# Patient Record
Sex: FEMALE | Race: BLACK OR AFRICAN AMERICAN
[De-identification: names, ages, dates, MRNs, and addresses within clinical notes are randomized per-mention and may not be internally consistent; named-entity substitution may affect disease eponyms.]

---

## 2017-10-18 ENCOUNTER — OFFICE VISIT (OUTPATIENT)
Dept: FAMILY MEDICINE | Facility: CLINIC | Age: 32
End: 2017-10-18

## 2017-10-18 VITALS
WEIGHT: 289.6 LBS | TEMPERATURE: 98.4 F | SYSTOLIC BLOOD PRESSURE: 120 MMHG | OXYGEN SATURATION: 98 % | DIASTOLIC BLOOD PRESSURE: 84 MMHG | BODY MASS INDEX: 46.74 KG/M2 | HEART RATE: 90 BPM | RESPIRATION RATE: 16 BRPM

## 2017-10-18 DIAGNOSIS — F19.20 CHEMICAL DEPENDENCY (H): Primary | ICD-10-CM

## 2017-10-18 DIAGNOSIS — N61.1 BOIL, BREAST: ICD-10-CM

## 2017-10-18 DIAGNOSIS — F51.4 NIGHT TERRORS, ADULT: ICD-10-CM

## 2017-10-18 DIAGNOSIS — F31.9 BIPOLAR 1 DISORDER (H): ICD-10-CM

## 2017-10-18 DIAGNOSIS — F41.9 ANXIETY: ICD-10-CM

## 2017-10-18 DIAGNOSIS — Z71.6 ENCOUNTER FOR TOBACCO USE CESSATION COUNSELING: ICD-10-CM

## 2017-10-18 ASSESSMENT — ANXIETY QUESTIONNAIRES
7. FEELING AFRAID AS IF SOMETHING AWFUL MIGHT HAPPEN: NOT AT ALL
1. FEELING NERVOUS, ANXIOUS, OR ON EDGE: SEVERAL DAYS
IF YOU CHECKED OFF ANY PROBLEMS ON THIS QUESTIONNAIRE, HOW DIFFICULT HAVE THESE PROBLEMS MADE IT FOR YOU TO DO YOUR WORK, TAKE CARE OF THINGS AT HOME, OR GET ALONG WITH OTHER PEOPLE: NOT DIFFICULT AT ALL
6. BECOMING EASILY ANNOYED OR IRRITABLE: NOT AT ALL
GAD7 TOTAL SCORE: 3
2. NOT BEING ABLE TO STOP OR CONTROL WORRYING: NOT AT ALL
3. WORRYING TOO MUCH ABOUT DIFFERENT THINGS: SEVERAL DAYS
5. BEING SO RESTLESS THAT IT IS HARD TO SIT STILL: NOT AT ALL

## 2017-10-18 ASSESSMENT — PATIENT HEALTH QUESTIONNAIRE - PHQ9
SUM OF ALL RESPONSES TO PHQ QUESTIONS 1-9: 3
5. POOR APPETITE OR OVEREATING: SEVERAL DAYS

## 2017-10-18 ASSESSMENT — PAIN SCALES - GENERAL: PAINLEVEL: MODERATE PAIN (5)

## 2017-10-18 NOTE — PROGRESS NOTES
HPI:       Judie Golden is a 32 year old who presents for the following  Patient presents with:  Derm Problem    Judie comes to clinic, The patient is new to this clinic and this writer.  1/Admitted [facility], meth clean date 7/28/17,    She had a boil on her left breast, she had a pocket that needed to be drained, she got her boil drained on Monday, 2 days ago, they packed it with gauze, she was at [facility] [place]. She has not had a dressing change. She's had boils in the past, but does not get them frequently, she once had one on her tailbone that needed to be drained. She had the pilonidal cyst last month. She started antibiotics for her boil, she is getting upset stomach. No fevers. She comes to clinic for assessment of boil that was drained, she has not showered or removed the gauze or changed the dressing from her left breast over the last 2 days. She feels pain and thinks it could be coming from the gauze/packing to left breast.     All meds reviewed, patient has hx of bipolar, night terrors, anxiety. She is taking meds listed below.     She is smoking 1/2ppd of cig.      Problem, Medication and Allergy Lists were   reviewed and are current.     Patient Active Problem List    Diagnosis Date Noted     Anxiety 10/18/2017     Priority: Medium     Bipolar 1 disorder (H) 10/18/2017     Priority: Medium     Night terrors, adult 10/18/2017     Priority: Medium     Chemical dependency (H) 10/18/2017     Priority: Medium     Encounter for tobacco use cessation counseling 10/18/2017     Priority: Medium         Current Outpatient Prescriptions   Medication Sig Dispense Refill     Mirtazapine (REMERON PO) Take 15 mg by mouth At Bedtime       TRAZODONE HCL PO Take 50 mg by mouth At Bedtime       RisperiDONE (RISPERDAL PO) Take 7.5 mg by mouth At Bedtime       CLONIDINE HCL PO Take 0.5 mg by mouth 2 times daily       FLUOXETINE HCL PO Take 20 mg by mouth daily       albuterol (PROAIR HFA, PROVENTIL HFA, VENTOLIN  HFA) 108 (90 BASE) MCG/ACT inhaler Inhale 2 puffs into the lungs every 4 hours as needed for shortness of breath / dyspnea for 30 days. 1 Inhaler 1     folic acid (FOLVITE) 400 MCG tablet Take 1 tablet (400 mcg) by mouth daily (Patient not taking: Reported on 10/18/2017) 90 tablet 3     citalopram (CELEXA) 20 MG tablet Take 20 mg by mouth daily. By [facility] in [place], [place]         No Known Allergies  Patient is   a new patient to this clinic and so  I reviewed/updated the Past Medical History, the Family History and the Social History. ,   Past Medical History:   Diagnosis Date     Depression      Pilonidal cyst 09/2017     Uncomplicated asthma    ,   Past Surgical History:   Procedure Laterality Date     LACERATION REPAIR  2014    Suture laceration repair to right arm     SOFT TISSUE SURGERY  09/2017    surgery on pilonidial cyst     Family History     Problem (# of Occurrences) Relation (Name,Age of Onset)    Depression (1) Mother    Hypertension (1) Mother    MENTAL ILLNESS (1) Father    Substance Abuse (2) Mother, Father    Suicide (1) Father       and   Social History     Social History     Marital status: Unknown     Spouse name: N/A     Number of children: 4     Years of education: 11th     Occupational History      Disabled     Social History Main Topics     Smoking status: Current Every Day Smoker     Packs/day: 0.50     Types: Cigarettes     Smokeless tobacco: Never Used     Alcohol use No     Drug use: No      Comment: Clean 7/28/17, [facility] dep 10/11/17     Sexual activity: No     Other Topics Concern     None     Social History Narrative    Activity: Does routine ADLs, otherwise no routine exercise    Diet: Healthy    Water Intake: 6 glasses of water a day    Caffeine Intake: 3 cans of soda a day, 1-2 cups of coffee a day    Sleep: 5-8 hours a night, does not feel rested when she wakes up, difficultly falling asleep, difficulty staying asleep    Stressors: Chem dep treatment     Support Network: Sisters, brothers    Chiquita/Yazdanism Affiliation: None    Childhood: Born and raised in [place], moved to [place] in 1999, family is in [place], raised by parents    Current Living Situation: Chem dep treatment [facility]    Future: After treatment she would like to be a family with her children, 4 children, 15, 13, 10, 1, 2 girls, 2 boys.              Review of Systems:   Review of Systems    ROS: 10 point ROS neg other than the symptoms noted above in the HPI.         Physical Exam:     Patient Vitals for the past 24 hrs:   BP Temp Temp src Pulse Resp SpO2 Weight   10/18/17 1422 120/84 98.4  F (36.9  C) Oral 90 16 98 % 289 lb 9.6 oz (131.4 kg)     Body mass index is 46.74 kg/(m^2).  Vitals were reviewed and were normal     Physical Exam   Constitutional: She is oriented to person, place, and time. She appears well-developed and well-nourished.   HENT:   Head: Normocephalic and atraumatic.   Nose: Nose normal.   Mouth/Throat: Oropharynx is clear and moist.   Eyes: Pupils are equal, round, and reactive to light.   Neck: Neck supple.   Cardiovascular: Normal rate and regular rhythm.    Pulmonary/Chest: Effort normal and breath sounds normal.   Lymphadenopathy:     She has no cervical adenopathy.   Neurological: She is alert and oriented to person, place, and time.   Skin: Skin is warm and dry.   Left breast at the skin fold there is a small wound with packing present, site has mild surrounding erythema, no edema, small amount of drainage, no odor noted.    Psychiatric: She has a normal mood and affect. Her behavior is normal.         Results:      Results from the last 24 hoursNo results found for this or any previous visit (from the past 24 hour(s)).  Assessment and Plan     Judie was seen today for derm problem.    Diagnoses and all orders for this visit:    Chemical dependency (H)  Patient encouraged in her sobriety, admitted to [facility] 10/11/18, clean from meth since 7/28/17. Continue at [facility]  residential program.    Boil, breast  Left breast boil incised and drained 2 days ago at [facility], packing removed today, wound is healing and no longer needs packing, cleansed with normal saline and covered with bandaid, continue to monitor for signs and symptoms of infection. Incision that is healing is less than 0.5 cm in size.    Anxiety  YUSUF-7 SCORE 10/18/2017   Total Score 3   Continue to monitor mood during transition.    Bipolar 1 disorder (H)  Continue current medication regimen, follow up with psychiatry.     Night terrors, adult  Continue current medication regimen, follow up with psychiatry.     Encounter for tobacco use cessation counseling  Smoking 1/2 ppd of cig will continue to consider cutting back.     Medications Discontinued During This Encounter   Medication Reason     guaiFENesin-codeine (ROBITUSSIN AC) 100-10 MG/5ML SOLN      Options for treatment and follow-up care were reviewed with the patient. Judie Golden  engaged in the decision making process and verbalized understanding of the options discussed and agreed with the final plan.    [facility] paperwork filled out and returned to patient, consent form signed for authorization to discuss protected health information with Leatha Hightower RN, [facility] RN.    Viki Germain, HEMA

## 2017-10-19 ASSESSMENT — ANXIETY QUESTIONNAIRES: GAD7 TOTAL SCORE: 3

## 2017-10-21 ENCOUNTER — HEALTH MAINTENANCE LETTER (OUTPATIENT)
Age: 32
End: 2017-10-21

## 2017-10-30 ENCOUNTER — OFFICE VISIT (OUTPATIENT)
Dept: FAMILY MEDICINE | Facility: CLINIC | Age: 32
End: 2017-10-30

## 2017-10-30 VITALS
SYSTOLIC BLOOD PRESSURE: 123 MMHG | TEMPERATURE: 98.4 F | HEART RATE: 85 BPM | WEIGHT: 293 LBS | DIASTOLIC BLOOD PRESSURE: 80 MMHG | RESPIRATION RATE: 16 BRPM | BODY MASS INDEX: 45.99 KG/M2 | HEIGHT: 67 IN | OXYGEN SATURATION: 100 %

## 2017-10-30 DIAGNOSIS — F51.04 CHRONIC INSOMNIA: ICD-10-CM

## 2017-10-30 DIAGNOSIS — Z00.00 ROUTINE GENERAL MEDICAL EXAMINATION AT A HEALTH CARE FACILITY: Primary | ICD-10-CM

## 2017-10-30 DIAGNOSIS — R20.2 PARESTHESIA: ICD-10-CM

## 2017-10-30 DIAGNOSIS — Z23 NEEDS FLU SHOT: ICD-10-CM

## 2017-10-30 DIAGNOSIS — N89.8 VAGINAL ITCHING: ICD-10-CM

## 2017-10-30 DIAGNOSIS — M54.50 ACUTE BILATERAL LOW BACK PAIN WITHOUT SCIATICA: ICD-10-CM

## 2017-10-30 DIAGNOSIS — F41.9 ANXIETY: ICD-10-CM

## 2017-10-30 DIAGNOSIS — F17.200 TOBACCO DEPENDENCE: ICD-10-CM

## 2017-10-30 DIAGNOSIS — L02.91 ABSCESS: ICD-10-CM

## 2017-10-30 DIAGNOSIS — F33.1 MAJOR DEPRESSIVE DISORDER, RECURRENT EPISODE, MODERATE (H): ICD-10-CM

## 2017-10-30 DIAGNOSIS — I45.6 WPW (WOLFF-PARKINSON-WHITE SYNDROME): ICD-10-CM

## 2017-10-30 DIAGNOSIS — F19.20 CHEMICAL DEPENDENCY (H): ICD-10-CM

## 2017-10-30 DIAGNOSIS — H61.22 IMPACTED CERUMEN OF LEFT EAR: ICD-10-CM

## 2017-10-30 PROBLEM — E66.01 MORBID OBESITY WITH BMI OF 45.0-49.9, ADULT (H): Status: ACTIVE | Noted: 2017-09-05

## 2017-10-30 PROBLEM — F19.10 SUBSTANCE ABUSE (H): Status: ACTIVE | Noted: 2017-08-08

## 2017-10-30 PROBLEM — F41.8 DEPRESSION WITH ANXIETY: Status: ACTIVE | Noted: 2017-08-08

## 2017-10-30 LAB
CLUE CELLS: NORMAL
MOTILE TRICHOMONAS: NEGATIVE
YEAST: PRESENT

## 2017-10-30 RX ORDER — FLUCONAZOLE 150 MG/1
150 TABLET ORAL ONCE
Qty: 2 TABLET | Refills: 0 | Status: SHIPPED | OUTPATIENT
Start: 2017-10-30 | End: 2017-10-30

## 2017-10-30 ASSESSMENT — PAIN SCALES - GENERAL: PAINLEVEL: NO PAIN (0)

## 2017-10-30 NOTE — PROGRESS NOTES
HPI     Judie Golden is a 32 year old female who presents today for physical exam. Judie Golden needs an admission physical to [facility] chemical dependency treatment facility and was admitted 10/11/18. Patient has a history of chemical dependency to meth, clean date 2017.  Last visit to the dentist was > 1 year ago and she'd like to get up to date on a dental cleaning. Last visit to an eye provider was > 1 year ago, she lost her glasses, she has nearsightedness and needs new glasses. Judie Golden would like to get up to date on immunizations and lab work. She would like to get a flu shot today.    . LMP 10/11/2017, regular, once a month, lasts 3-4 days, menarche age 15. Not currently sexually active. She had STI check at physical at [facility] several months ago. She has cysts on her ovaries. She went to the ED 1.5 months ago, she was having severe pain. She had an ultrasound performed and they told her the pain was related to an ovarian cyst.     Care Everywhere records reviewed, patient did have blood work on 17 lipids done ldl 143, HIV and hepatitis were negative.     She recently has had some issues with a pilonidial cyst and breast abscess. Had a Pilonidial cyst on 2017, then the following month 10/2017 she got a left breast abscess, incision and drainage was performed, one week later another cyst was noted to the left breast, this was not incised and drained but fluid was aspirated and a culture was sent. Wound culture was negative and reviewed today, she desires a breast exam. She still feels a lump at that spot on the left breast, no pain.    She has hx of anxiety, depression, insomnia, currently taking medications for this, please see med list.   PHQ-9 SCORE 10/18/2017   Total Score 3     YUSUF-7 SCORE 10/18/2017   Total Score 3     She currently uses tobacco, smoking 1/2 ppd of cigarettes, does not desire to quit but desires to cut back.     She has some concerns with vaginal  itching. It started several days ago. She has been on antibiotics recently for treatment of an abscess. Some pain and burning when she urinates.     She also notes a Small amount of blood with stool. She has some constipation, some blood on Toilet paper 2 days ago. She's never had this before.     She has low back pain without radiation, has been getting worse over the last month, take prn tylenol and ibuprofen.     She has History Lacey-Parkinson-White syndrome, she turned in a zeal patch that recorded her heart rhythms x 2 weeks, returned it and [facility] be following up, [facility]. She last saw cardiology at [facility] last month.     Patient Active Problem List   Diagnosis     Anxiety     Bipolar 1 disorder (H)     Night terrors, adult     Chemical dependency (H)     Encounter for tobacco use cessation counseling     Abscess     Chronic insomnia     Major depressive disorder, recurrent episode, moderate (H)     Morbid obesity with BMI of 45.0-49.9, adult (H)     Right knee pain     Substance abuse     Tobacco dependence     WPW (Lacey-Parkinson-White syndrome)     Paresthesia     Current Outpatient Prescriptions   Medication Sig Dispense Refill     Mirtazapine (REMERON PO) Take 15 mg by mouth At Bedtime       TRAZODONE HCL PO Take 50 mg by mouth At Bedtime       RisperiDONE (RISPERDAL PO) Take 7.5 mg by mouth At Bedtime       CLONIDINE HCL PO Take 0.5 mg by mouth 2 times daily       FLUOXETINE HCL PO Take 20 mg by mouth daily       albuterol (PROAIR HFA, PROVENTIL HFA, VENTOLIN HFA) 108 (90 BASE) MCG/ACT inhaler Inhale 2 puffs into the lungs every 4 hours as needed for shortness of breath / dyspnea for 30 days. 1 Inhaler 1     Allergies   Allergen Reactions     Acetaminophen Swelling     Aspirin Swelling     Results for orders placed or performed in visit on 10/30/17 (from the past 24 hour(s))   Wet Prep (LabDAQ)   Result Value Ref Range    Yeast Wet Prep Present none    Motile  Trichomonas Wet Prep Negative Negative    Clue Cells Wet Prep Present <20% NONE          History     Past Medical History:   Diagnosis Date     Depression      Pilonidal cyst 09/2017     Self-injurious behavior 6/1/2011     Uncomplicated asthma      Past Surgical History:   Procedure Laterality Date     LACERATION REPAIR  2014    Suture laceration repair to right arm     SOFT TISSUE SURGERY  09/2017    surgery on pilonidial cyst     SOFT TISSUE SURGERY  10/2017    Breast abscess     Social History     Social History     Marital status: Unknown     Spouse name: N/A     Number of children: 4     Years of education: 11th     Occupational History      Disabled     Social History Main Topics     Smoking status: Current Every Day Smoker     Packs/day: 0.50     Types: Cigarettes     Smokeless tobacco: Never Used     Alcohol use No     Drug use: No      Comment: Clean 7/28/17, [facility] dep 10/11/17     Sexual activity: No     Other Topics Concern     Not on file     Social History Narrative    Activity: Does routine ADLs, otherwise no routine exercise    Diet: Healthy    Water Intake: 6 glasses of water a day    Caffeine Intake: 3 cans of soda a day, 1-2 cups of coffee a day    Sleep: 5-8 hours a night, does not feel rested when she wakes up, difficultly falling asleep, difficulty staying asleep    Stressors: Chem dep treatment    Support Network: Sisters, brothers    Chiquita/Rastafari Affiliation: None    Childhood: Born and raised in [place], moved to [place] in [place], family is in [place], raised by parents    Current Living Situation: Chem dep treatment [facility]    Future: After treatment she would like to be a family with her children, 4 children, 15, 13, 10, 1, 2 girls, 2 boys.         Family History   Problem Relation Age of Onset     Depression Mother      Hypertension Mother      Substance Abuse Mother      DIABETES Mother      Suicide Father      MENTAL ILLNESS Father      Substance Abuse Father             Review of  "Systems:    ROS: 10 point ROS neg other than the symptoms noted above in the HPI.     Positive paresthesias to hands bilaterally       Physical Exam:     Vitals:    10/30/17 1004   BP: 123/80   BP Location: Right arm   Patient Position: Chair   Cuff Size: Adult Large   Pulse: 85   Resp: 16   Temp: 98.4  F (36.9  C)   TempSrc: Oral   SpO2: 100%   Weight: 296 lb 12.8 oz (134.6 kg)   Height: 5' 6.6\" (169.2 cm)     Body mass index is 47.05 kg/(m^2).    GENERAL: healthy, alert and no distress  EYES: Eyes grossly normal to inspection, extraocular movements - intact, and PERRL  HENT: ear canals- normal; TMs- normal to right ear, left TM not visualized due to cerumen impaction; Nose- normal; Mouth- no ulcers, no lesions  NECK: no tenderness, no adenopathy, no asymmetry, no masses, no stiffness; thyroid- normal to palpation  RESP: lungs clear to auscultation - no rales, no rhonchi, no wheezes  BREAST: no masses, no tenderness, no nipple discharge, no palpable axillary masses or adenopathy, left breast with healing abscess  CV: regular rates and rhythm, normal S1 S2, no S3 or S4 and no murmur, no click or rub -  ABDOMEN: soft, no tenderness, no  hepatosplenomegaly, no masses, normal bowel sounds  MS: extremities- no gross deformities noted, no edema  SKIN: no suspicious lesions, no rashes, inflamed pustules to face and bilateral cheeks  NEURO: strength and tone- normal, sensory exam- grossly normal, mentation- intact, speech- normal, reflexes- symmetric 2+  BACK: no CVA tenderness, no paralumbar tenderness, low back pain across the width of her low back over the lumbar area  - female: cervix- normal, adnexae- normal; uterus- normal, no masses, white odorless discharge  RECTAL- female: Deferred  PSYCH: Alert and oriented times 3; speech- coherent , normal rate and volume; able to articulate logical thoughts, able to abstract reason, no tangential thoughts, no hallucinations or delusions, affect- normal  LYMPHATICS: ant. " cervical- normal, post. cervical- normal, axillary- normal, supraclavicular- normal, inguinal- normal    Orders Only on 01/29/2014   Component Date Value Ref Range Status     HCG Qualitative Serum 01/29/2014 Negative  NEG Final     Assessment and Plan      Juide was seen today for physical, breast exam and back pain.    Diagnoses and all orders for this visit:    Routine general medical examination at a health care facility  32 year old female with physical, discussed health maintenance. Encouraged healthy eating and activity, she is somewhat limited at this time to her physical activity and will work on increasing her activity. She reports getting a physical at [facility] 2 months ago, she will sign a release of records so I can review those, reports pap is WNL and up to date.     Chemical dependency (H)  Patient encouraged in her sobriety, admitted to  [facility] 10/11/18, clean from meth 7/28/17    Needs flu shot  -     VACCINE ADMINISTRATION, INITIAL  -     C FLU VAC QUADRIVALENT SPLIT VIRUS 3+YRS IM  Tolerate well    Abscess  Hx of pilonidial cyst, breast abscess. Reassured patient today the left breast is healing, continue to monitor.     Anxiety  YUSUF-7 SCORE 10/18/2017   Total Score 3   Continue Clonidine 0.5 mg po bid, follow up psych    Major depressive disorder, recurrent episode, moderate (H)  PHQ-9 SCORE 10/18/2017   Total Score 3   Continue to monitor mood. Continue fluoxetine 20 mg po daily, risperidone 7.5 mg po at bedtime.    Chronic insomnia  Stable, continue to monitor, continue trazodone 50 mg at bedtime, mirtazapine 15 mg at bedtime.    Tobacco dependence  -     SMOKING CESSATION COUNSELING 3-10 MIN  3 minutes spent on smoking cessation, discussed cutting back, smoking 1/2 ppd, does not desire to quit but will cut back.     Vaginal itching  -     Wet Prep (LabDAQ)  -     fluconazole (DIFLUCAN) 150 MG tablet; Take 1 tablet (150 mg) by mouth once for 1 dose  Yeast infection likely caused by  antibiotics from breast abscess, will treat with diflucan.     Acute bilateral low back pain without sciatica  -     PHYSICAL THERAPY REFERRAL  Discussed starting in PT for back pain, continue tylenol and ibuprofen prn pain.     WPW (Lacey-Parkinson-White syndrome)  Patient turned in heart monitor last week, follow up with cardiology at [facility], she did sign a record of release for her records.     Impacted cerumen of left ear  -     HC REMOVAL IMPACTED CERUMEN IRRIGATION/LVG UNILAT  Good cerumen return after warm water ear flush.     Paresthesia  Paresthesia to bilateral hands, will follow up in 2 weeks for further discussion.     Options for treatment and follow-up care were reviewed with the patient and/or guardian. Judie Golden and/or guardian engaged in the decision making process and verbalized understanding of the options discussed and agreed with the final plan.  [facility] paperwork filled out and returned to patient, consent form signed for authorization to discuss protected health information with Leatha Hightower RN, [facility] RN.    Viki Germain NP    There are no Patient Instructions on file for this visit.

## 2017-10-30 NOTE — NURSING NOTE
"32 year old  Chief Complaint   Patient presents with     Physical     Had a full physical about 2 months ago. Pap results came back normal. Today pt feels like she has a UTI. Symptoms: burning when urinating, itchy, foul odor x couple of days.      Breast Exam     Lump on left side on breast x 2 weeks. 4 days later she found another lump after they lanced it (under breast)     Back Pain     x months she feels like she is limited to walking. No pain when sitting/laying. Standing hurts.        Blood pressure 123/80, pulse 85, temperature 98.4  F (36.9  C), temperature source Oral, resp. rate 16, height 5' 6.6\" (169.2 cm), weight 296 lb 12.8 oz (134.6 kg), last menstrual period 09/11/2017, SpO2 100 %, not currently breastfeeding. Body mass index is 47.05 kg/(m^2).  BP completed using cuff size: regular    Patient Active Problem List   Diagnosis     Anxiety     Bipolar 1 disorder (H)     Night terrors, adult     Chemical dependency (H)     Encounter for tobacco use cessation counseling     Abscess     Chronic insomnia     Depression with anxiety     Major depressive disorder, recurrent episode, moderate (H)     Morbid obesity with BMI of 45.0-49.9, adult (H)     Right knee pain     Self-injurious behavior     Substance abuse     Tobacco dependence     WPW (Lacey-Parkinson-White syndrome)       Wt Readings from Last 2 Encounters:   10/30/17 296 lb 12.8 oz (134.6 kg)   10/18/17 289 lb 9.6 oz (131.4 kg)     BP Readings from Last 3 Encounters:   10/30/17 123/80   10/18/17 120/84   10/16/13 104/72       Current Outpatient Prescriptions   Medication     Mirtazapine (REMERON PO)     TRAZODONE HCL PO     RisperiDONE (RISPERDAL PO)     CLONIDINE HCL PO     FLUOXETINE HCL PO     folic acid (FOLVITE) 400 MCG tablet     citalopram (CELEXA) 20 MG tablet     albuterol (PROAIR HFA, PROVENTIL HFA, VENTOLIN HFA) 108 (90 BASE) MCG/ACT inhaler     No current facility-administered medications for this visit.        Social History "   Substance Use Topics     Smoking status: Current Every Day Smoker     Packs/day: 0.50     Types: Cigarettes     Smokeless tobacco: Never Used     Alcohol use No       Health Maintenance Due   Topic Date Due     TETANUS IMMUNIZATION (SYSTEM ASSIGNED)  04/30/2003     DEPRESSION ACTION PLAN Q1 YR  04/30/2003     PAP SCREENING Q3 YR (SYSTEM ASSIGNED)  10/08/2016     INFLUENZA VACCINE (SYSTEM ASSIGNED)  09/01/2017       Lab Results   Component Value Date    PAP NIL / M.  INCREASED MICROBIAL EDITH. 10/08/2013       No flowsheet data found.    PHQ-9 SCORE 10/18/2017   Total Score 3       YUSUF-7 SCORE 10/18/2017   Total Score 3       No flowsheet data found.    Berna Sparks MA  October 30, 2017 10:08 AM

## 2017-10-30 NOTE — MR AVS SNAPSHOT
"              After Visit Summary   10/30/2017    Judie Golden    MRN: [id number]           Patient Information     Date Of Birth          1985        Visit Information        Provider Department      10/30/2017 10:00 AM Viki Germain NP [facility]        Today's Diagnoses     Routine general medical examination at a health care facility    -  1    Chemical dependency (H)        Needs flu shot        Abscess        Anxiety        Major depressive disorder, recurrent episode, moderate (H)        Chronic insomnia        Tobacco dependence        Vaginal itching        Acute bilateral low back pain without sciatica        Impacted cerumen of left ear           Follow-ups after your visit        Additional Services     PHYSICAL THERAPY REFERRAL       *This therapy referral will be filtered to a centralized scheduling office at [facility] and the patient will receive a call to schedule an appointment at a [place] location most convenient for them. *     [facility] provides Physical Therapy evaluation and treatment and many specialty services across the [place] system.  If requesting a specialty program, please choose from the list below.    If you have not heard from the scheduling office within 2 business days, please call [phone] for all locations, with the exception of [place], please call [phone].  Treatment: Evaluation & Treatment  Special Instructions/Modalities: PT to eval and treat for low back pain  Special Programs: None    Please be aware that coverage of these services is subject to the terms and limitations of your health insurance plan.  Call member services at your health plan with any benefit or coverage questions.      **Note to Provider:  If you are referring outside of [place] for the therapy appointment, please list the name of the location in the \"special instructions\" above, print the referral and give to the patient to " "schedule the appointment.                  Who to contact     Please call your clinic at [phone] to:    Ask questions about your health    Make or cancel appointments    Discuss your medicines    Learn about your test results    Speak to your doctor   If you have compliments or concerns about an experience at your clinic, or if you wish to file a complaint, please contact [facility] Patient Relations at [phone] or email us at [email or web address]         Additional Information About Your Visit        DNA Response Information     DNA Response is an electronic gateway that provides easy, online access to your medical records. With DNA Response, you can request a clinic appointment, read your test results, renew a prescription or communicate with your care team.     To sign up for DNA Response visit the website at [email or web address]   You will be asked to enter the access code listed below, as well as some personal information. Please follow the directions to create your username and password.     Your access code is: [email or web address]  Expires: 2018 11:14 AM     Your access code will  in 90 days. If you need help or a new code, please contact your [facility] or call [phone] for assistance.        Care EveryWhere ID     This is your Care EveryWhere ID. This could be used by other organizations to access your [place] medical records  [id number]        Your Vitals Were     Pulse Temperature Respirations Height Last Period Pulse Oximetry    85 98.4  F (36.9  C) (Oral) 16 5' 6.6\" (169.2 cm) 2017 (Exact Date) 100%    Breastfeeding? BMI (Body Mass Index)                No 47.05 kg/m2           Blood Pressure from Last 3 Encounters:   10/30/17 123/80   10/18/17 120/84   10/16/13 104/72    Weight from Last 3 Encounters:   10/30/17 296 lb 12.8 oz (134.6 kg)   10/18/17 289 lb 9.6 oz (131.4 kg)   10/16/13 224 lb 13.9 oz (102 kg)            "   We Performed the Following     C FLU VAC QUADRIVALENT SPLIT VIRUS 3+YRS IM     HC REMOVAL IMPACTED CERUMEN IRRIGATION/LVG UNILAT     PHYSICAL THERAPY REFERRAL     SMOKING CESSATION COUNSELING 3-10 MIN     VACCINE ADMINISTRATION, INITIAL     Wet Prep (LabDAQ)          Today's Medication Changes          These changes are accurate as of: 10/30/17 11:14 AM.  If you have any questions, ask your nurse or doctor.               Start taking these medicines.        Dose/Directions    fluconazole 150 MG tablet   Commonly known as:  DIFLUCAN   Used for:  Vaginal itching   Started by:  Viki Germain NP        Dose:  150 mg   Take 1 tablet (150 mg) by mouth once for 1 dose   Quantity:  2 tablet   Refills:  0            Where to get your medicines      These medications were sent to [facility] #[id number] - [place] [address] [facility]  [address] [facility] [address]     Phone:  [phone]     fluconazole 150 MG tablet                Primary Care Provider Office Phone # Fax #    Viki Germain [phone]       [facility] [address] [place] [address]        Equal Access to Services     ISAMAR STARR : Hadii uriel ku hadasho Soomaali, waaxda luqadaha, qaybta kaalmada adeegyada, karla junior . So [facility] [phone].    ATENCIÓN: Si habla español, tiene a bolanos disposición servicios gratuitos de asistencia lingüística. Llame al [phone].    We comply with applicable federal civil rights laws and [place] laws. We do not discriminate on the basis of race, color, national origin, age, disability, sex, sexual orientation, or gender identity.            Thank you!     Thank you for choosing [facility]  for your care. Our goal is always to provide you with excellent care. Hearing back from our patients is one way we can continue to improve our services. Please take a few minutes to complete the written survey that you may receive in  the mail after your visit with us. Thank you!             Your Updated Medication List - Protect others around you: Learn how to safely use, store and throw away your medicines at www.disposemymeds.org.          This list is accurate as of: 10/30/17 11:14 AM.  Always use your most recent med list.                   Brand Name Dispense Instructions for use Diagnosis    albuterol 108 (90 BASE) MCG/ACT Inhaler    PROAIR HFA/PROVENTIL HFA/VENTOLIN HFA    1 Inhaler    Inhale 2 puffs into the lungs every 4 hours as needed for shortness of breath / dyspnea for 30 days.    Acute asthmatic bronchitis       CLONIDINE HCL PO      Take 0.5 mg by mouth 2 times daily        fluconazole 150 MG tablet    DIFLUCAN    2 tablet    Take 1 tablet (150 mg) by mouth once for 1 dose    Vaginal itching       FLUOXETINE HCL PO      Take 20 mg by mouth daily        REMERON PO      Take 15 mg by mouth At Bedtime        RISPERDAL PO      Take 7.5 mg by mouth At Bedtime        TRAZODONE HCL PO      Take 50 mg by mouth At Bedtime

## 2017-10-31 PROBLEM — R20.2 PARESTHESIA: Status: ACTIVE | Noted: 2017-10-31

## 2017-11-16 ENCOUNTER — OFFICE VISIT (OUTPATIENT)
Dept: FAMILY MEDICINE | Facility: CLINIC | Age: 32
End: 2017-11-16

## 2017-11-16 VITALS
OXYGEN SATURATION: 96 % | SYSTOLIC BLOOD PRESSURE: 137 MMHG | HEIGHT: 67 IN | HEART RATE: 80 BPM | WEIGHT: 293 LBS | RESPIRATION RATE: 16 BRPM | TEMPERATURE: 98.2 F | DIASTOLIC BLOOD PRESSURE: 83 MMHG | BODY MASS INDEX: 45.99 KG/M2

## 2017-11-16 DIAGNOSIS — G89.29 CHRONIC MIDLINE LOW BACK PAIN, WITH SCIATICA PRESENCE UNSPECIFIED: Primary | ICD-10-CM

## 2017-11-16 DIAGNOSIS — M54.5 CHRONIC MIDLINE LOW BACK PAIN, WITH SCIATICA PRESENCE UNSPECIFIED: Primary | ICD-10-CM

## 2017-11-16 DIAGNOSIS — R60.0 BILATERAL LOWER EXTREMITY EDEMA: ICD-10-CM

## 2017-11-16 ASSESSMENT — PAIN SCALES - GENERAL: PAINLEVEL: NO PAIN (0)

## 2017-11-16 NOTE — NURSING NOTE
"32 year old  Chief Complaint   Patient presents with     Clinic Care Coordination - Follow-up     Emergency visit. Referral for Physical Therapy. Lab results.     Edema     x month. Legs and feet. Don't know what's causing the swelling.        Blood pressure (!) 135/95, pulse 82, temperature 98.2  F (36.8  C), temperature source Oral, resp. rate 16, height 5' 6.6\" (169.2 cm), weight (!) 302 lb 1.6 oz (137 kg), last menstrual period 10/03/2017, SpO2 96 %, not currently breastfeeding. Body mass index is 47.89 kg/(m^2).  BP completed using cuff size: regular    Patient Active Problem List   Diagnosis     Anxiety     Bipolar 1 disorder (H)     Night terrors, adult     Chemical dependency (H)     Encounter for tobacco use cessation counseling     Abscess     Chronic insomnia     Major depressive disorder, recurrent episode, moderate (H)     Morbid obesity with BMI of 45.0-49.9, adult (H)     Right knee pain     Substance abuse     Tobacco dependence     WPW (Lacey-Parkinson-White syndrome)     Paresthesia       Wt Readings from Last 2 Encounters:   11/16/17 (!) 302 lb 1.6 oz (137 kg)   10/30/17 296 lb 12.8 oz (134.6 kg)     BP Readings from Last 3 Encounters:   11/16/17 (!) 135/95   10/30/17 123/80   10/18/17 120/84       Current Outpatient Prescriptions   Medication     Mirtazapine (REMERON PO)     TRAZODONE HCL PO     RisperiDONE (RISPERDAL PO)     CLONIDINE HCL PO     FLUOXETINE HCL PO     albuterol (PROAIR HFA, PROVENTIL HFA, VENTOLIN HFA) 108 (90 BASE) MCG/ACT inhaler     No current facility-administered medications for this visit.        Social History   Substance Use Topics     Smoking status: Current Every Day Smoker     Packs/day: 0.50     Types: Cigarettes     Smokeless tobacco: Never Used     Alcohol use No       Health Maintenance Due   Topic Date Due     DEPRESSION ACTION PLAN Q1 YR  04/30/2003       Lab Results   Component Value Date    PAP NIL / M.  INCREASED MICROBIAL EDITH. 10/08/2013       No flowsheet " data found.    PHQ-9 SCORE 10/18/2017   Total Score 3       YUSUF-7 SCORE 10/18/2017   Total Score 3       No flowsheet data found.    Berna Sparks MA  November 16, 2017 11:50 AM

## 2017-11-16 NOTE — MR AVS SNAPSHOT
"              After Visit Summary   11/16/2017    Judie Golden    MRN: [id number]           Patient Information     Date Of Birth          1985        Visit Information        Provider Department      11/16/2017 11:30 AM Dahlia Vickers, NP [facility]        Today's Diagnoses     Chronic midline low back pain, with sciatica presence unspecified    -  1    Bilateral lower extremity edema           Follow-ups after your visit        Additional Services     PHYSICAL THERAPY REFERRAL       *This therapy referral will be filtered to a centralized scheduling office at [facility] and the patient will receive a call to schedule an appointment at a [place] location most convenient for them. *     [facility] provides Physical Therapy evaluation and treatment and many specialty services across the [place] system.  If requesting a specialty program, please choose from the list below.    If you have not heard from the scheduling office within 2 business days, please call [phone] for all locations, with the exception of [place], please call [phone].  Treatment: Evaluation & Treatment  Special Instructions/Modalities:  Evaluate low back pain and treat      Please be aware that coverage of these services is subject to the terms and limitations of your health insurance plan.  Call member services at your health plan with any benefit or coverage questions.      **Note to Provider:  If you are referring outside of [place] for the therapy appointment, please list the name of the location in the \"special instructions\" above, print the referral and give to the patient to schedule the appointment.                  Your next 10 appointments already scheduled     Nov 22, 2017  5:30 PM CST   (Arrive by 5:15 PM)   JOSE Spine with Gomez aMcias, PT   [facility] ([facility])    [address] [place] [address] " [id number]   [phone]            2017  6:00 PM CST   JOSE Spine with aKren Stewart, PT   [facility] ([facility])    [address] [place] [id number]   [phone]            Dec 05, 2017  5:30 PM CST   JOSE Spine with Gomez Macias, PT   [facility] ([facility])    [facility] [facility] [address]   [phone]              Who to contact     Please call your clinic at [phone] to:    Ask questions about your health    Make or cancel appointments    Discuss your medicines    Learn about your test results    Speak to your doctor   If you have compliments or concerns about an experience at your clinic, or if you wish to file a complaint, please contact [facility] Patient Relations at [phone] or email us at [email or web address]         Additional Information About Your Visit        Yummy Food Information     Y[employer] is an electronic gateway that provides easy, online access to your medical records. With [employer], you can request a clinic appointment, read your test results, renew a prescription or communicate with your care team.     To sign up for [employer] visit the website at [email or web address]   You will be asked to enter the access code listed below, as well as some personal information. Please follow the directions to create your username and password.     Your access code is: [email or web address]  Expires: 2018 10:14 AM     Your access code will  in 90 days. If you need help or a new code, please contact your [facility] or call [phone] for assistance.        Care EveryWhere ID     This is your Care EveryWhere ID. This could be used by other organizations to access your [place] medical records  [id number]        Your Vitals Were     Pulse Temperature Respirations Height Last Period  "Pulse Oximetry    80 98.2  F (36.8  C) (Oral) 16 5' 6.6\" (169.2 cm) 10/03/2017 (Exact Date) 96%    Breastfeeding? BMI (Body Mass Index)                No 47.89 kg/m2           Blood Pressure from Last 3 Encounters:   11/16/17 137/83   10/30/17 123/80   10/18/17 120/84    Weight from Last 3 Encounters:   11/16/17 (!) 302 lb 1.6 oz (137 kg)   10/30/17 296 lb 12.8 oz (134.6 kg)   10/18/17 289 lb 9.6 oz (131.4 kg)              We Performed the Following     Compression Stockings (TEDS, JOBST) Print Out     PHYSICAL THERAPY REFERRAL        Primary Care Provider Office Phone # Fax #    Viki Germain [phone]       [facility] [address] [place] [address]        Equal Access to Services     ISAMAR STARR : Hadii uriel benavideso Soderek, waaxda luqadaha, qaybta kaalmada adedeliayada, karla junior . So [facility] [phone].    ATENCIÓN: Si habla espsunday, tiene a bolanos disposición servicios gratuitos de asistencia lingüística. Llame al [phone].    We comply with applicable federal civil rights laws and [place] laws. We do not discriminate on the basis of race, color, national origin, age, disability, sex, sexual orientation, or gender identity.            Thank you!     Thank you for choosing [facility]  for your care. Our goal is always to provide you with excellent care. Hearing back from our patients is one way we can continue to improve our services. Please take a few minutes to complete the written survey that you may receive in the mail after your visit with us. Thank you!             Your Updated Medication List - Protect others around you: Learn how to safely use, store and throw away your medicines at www.disposemymeds.org.          This list is accurate as of: 11/16/17 11:59 PM.  Always use your most recent med list.                   Brand Name Dispense Instructions for use Diagnosis    albuterol 108 (90 BASE) MCG/ACT Inhaler    " PROAIR HFA/PROVENTIL HFA/VENTOLIN HFA    1 Inhaler    Inhale 2 puffs into the lungs every 4 hours as needed for shortness of breath / dyspnea for 30 days.    Acute asthmatic bronchitis       CLONIDINE HCL PO      Take 0.5 mg by mouth 2 times daily        FLUOXETINE HCL PO      Take 20 mg by mouth daily        REMERON PO      Take 15 mg by mouth At Bedtime        RISPERDAL PO      Take 7.5 mg by mouth At Bedtime        TRAZODONE HCL PO      Take 50 mg by mouth At Bedtime

## 2017-11-20 NOTE — PROGRESS NOTES
Judie Golden is a 32 year old female who presents today for emergency room follow up.  She was [facility] [place] [facility] [place], sent by MARGARETH Palma, for bilateral lower extremity edema.  Her discharge diagnosis was venous congestion of lower extremities, all other potential diagnoses were ruled out.  Judie states she does not know why she has this condition.  She purchased ankle length compression stockings and has been wearing them.    Review Of Systems  See ED note of 11/16/17.    Past Medical History:   Diagnosis Date     Depression      Pilonidal cyst 09/2017     Self-injurious behavior 6/1/2011     Uncomplicated asthma      Past Surgical History:   Procedure Laterality Date     LACERATION REPAIR  2014    Suture laceration repair to right arm     SOFT TISSUE SURGERY  09/2017    surgery on pilonidial cyst     SOFT TISSUE SURGERY  10/2017    Breast abscess     Social History     Social History     Marital status: Unknown     Spouse name: N/A     Number of children: 4     Years of education: 11th     Occupational History      Disabled     Social History Main Topics     Smoking status: Current Every Day Smoker     Packs/day: 0.50     Types: Cigarettes     Smokeless tobacco: Never Used     Alcohol use No     Drug use: No      Comment: Clean 7/28/17, MARGARETH Palma chem dep 10/11/17     Sexual activity: No     Other Topics Concern     Not on file     Social History Narrative    Activity: Does routine ADLs, otherwise no routine exercise    Diet: Healthy    Water Intake: 6 glasses of water a day    Caffeine Intake: 3 cans of soda a day, 1-2 cups of coffee a day    Sleep: 5-8 hours a night, does not feel rested when she wakes up, difficultly falling asleep, difficulty staying asleep    Stressors: Chem dep treatment    Support Network: Sisters, brothers    Chiquita/Restorationist Affiliation: None    Childhood: Born and raised in [place], moved to [place] in 1999, family is in [place], raised by parents    Current Living Situation: Chem dep  "treatment [facility] [place]    Future: After treatment she would like to be a family with her children, 4 children, 15, 13, 10, 1, 2 girls, 2 boys.         Family History   Problem Relation Age of Onset     Depression Mother      Hypertension Mother      Substance Abuse Mother      DIABETES Mother      Suicide Father      MENTAL ILLNESS Father      Substance Abuse Father        /83 (BP Location: Right arm, Patient Position: Chair, Cuff Size: Adult Large)  Pulse 80  Temp 98.2  F (36.8  C) (Oral)  Resp 16  Ht 5' 6.6\" (169.2 cm)  Wt (!) 302 lb 1.6 oz (137 kg)  LMP 10/03/2017 (Exact Date)  SpO2 96%  Breastfeeding? No  BMI 47.89 kg/m2    Exam:  Constitutional: healthy, alert and no distress  Head: Normocephalic. No masses, lesions, tenderness or abnormalities  Neck: Neck supple. No adenopathy. Thyroid symmetric, normal size,, Carotids without bruits.  ENT: ENT exam normal, no neck nodes or sinus tenderness  Cardiovascular: negative, PMI normal. No lifts, heaves, or thrills. RRR. No murmurs, clicks gallops or rub  Respiratory: negative, Percussion normal. Good diaphragmatic excursion. Lungs clear  : Deferred  Musculoskeletal: gait normal, no varicosities, normal muscle tone, 2+ ankle edema and peripheral pulses normal.  She has indentations at her ankles where her compression stockings are.  ROM of back impeded by low pain and stiffness.   Skin: no suspicious lesions or rashes  Psychiatric: mentation appears normal and affect normal/bright    Assessment/Plan:  1. Chronic midline low back pain, with sciatica presence unspecified    - PHYSICAL THERAPY REFERRAL    Full length compression stockings, RX copied and sent with patient.  Discussed with Zonia at E.    RTC 2 weeks.    2. Bilateral lower extremity edema    - Compression Stockings (TEDS, JOBST) Print Out    "

## 2017-11-27 ENCOUNTER — THERAPY VISIT (OUTPATIENT)
Dept: PHYSICAL THERAPY | Facility: CLINIC | Age: 32
End: 2017-11-27
Payer: MEDICARE

## 2017-11-27 DIAGNOSIS — M54.41 ACUTE BILATERAL LOW BACK PAIN WITH RIGHT-SIDED SCIATICA: Primary | ICD-10-CM

## 2017-11-27 PROCEDURE — G8979 MOBILITY GOAL STATUS: HCPCS | Mod: GP | Performed by: PHYSICAL THERAPIST

## 2017-11-27 PROCEDURE — 97161 PT EVAL LOW COMPLEX 20 MIN: CPT | Mod: GP | Performed by: PHYSICAL THERAPIST

## 2017-11-27 PROCEDURE — G8978 MOBILITY CURRENT STATUS: HCPCS | Mod: GP | Performed by: PHYSICAL THERAPIST

## 2017-11-27 PROCEDURE — 97110 THERAPEUTIC EXERCISES: CPT | Mod: GP | Performed by: PHYSICAL THERAPIST

## 2017-11-27 NOTE — MR AVS SNAPSHOT
After Visit Summary   11/27/2017    Judie Golden    MRN: [id number]           Patient Information     Date Of Birth          1985        Visit Information        Provider Department      11/27/2017 6:00 PM Karen Stewart, PT [facility]        Today's Diagnoses     Acute bilateral low back pain with right-sided sciatica    -  1       Follow-ups after your visit        Your next 10 appointments already scheduled     Dec 05, 2017  5:30 PM CST   JOSE Spine with Gomez Macias PT   [facility] ([facility])    [address] [place] [id number]   [phone]            Dec 15, 2017  3:30 PM CST   JOSE Spine with Gomez Macias PT   [facility] ([facility])    [address] [place] [id number]   [phone]            Dec 22, 2017 11:00 AM CST   JOSE Spine with Karen Stewart PT   [facility] ([facility])    [address] [place] [id number]   [phone]              Who to contact     If you have questions or need follow up information about today's clinic visit or your schedule please contact [facility] directly at [phone].  Normal or non-critical lab and imaging results will be communicated to you by MyChart, letter or phone within 4 business days after the clinic has received the results. If you do not hear from us within 7 days, please contact the clinic through MyChart or phone. If you have a critical or abnormal lab result, we will notify you by phone as soon as possible.  Submit refill requests through [employer] or call your pharmacy and they will forward the refill request to us. Please allow 3 business days for your refill to be completed.          Additional Information About Your Visit        [facility] Information     [employer] lets you  "send messages to your doctor, view your test results, renew your prescriptions, schedule appointments and more. To sign up, go to [email or web address] . Click on \"Log in\" on the left side of the screen, which will take you to the Welcome page. Then click on \"Sign up Now\" on the right side of the page.     You will be asked to enter the access code listed below, as well as some personal information. Please follow the directions to create your username and password.     Your access code is: [email or web address]  Expires: 2018 10:14 AM     Your access code will  in 90 days. If you need help or a new code, please call your [facility] clinic or [phone].        Care EveryWhere ID     This is your Care EveryWhere ID. This could be used by other organizations to access your [facility] medical records  [id number]        Your Vitals Were     Last Period                   10/03/2017 (Exact Date)            Blood Pressure from Last 3 Encounters:   17 137/83   10/30/17 123/80   10/18/17 120/84    Weight from Last 3 Encounters:   17 (!) 137 kg (302 lb 1.6 oz)   10/30/17 134.6 kg (296 lb 12.8 oz)   10/18/17 131.4 kg (289 lb 9.6 oz)              We Performed the Following     HC PT EVAL, LOW COMPLEXITY     JOSE INITIAL EVAL REPORT     THERAPEUTIC EXERCISES        Primary Care Provider Office Phone # Fax #    Viki Germain [phone]       [facility] [address] [place] [id number]        Equal Access to Services     [facility] RO : Hadii aad ku hadasho Soomaali, waaxda luqadaha, qaybta kaalmada lauryn, karla rea. So [facility] [phone].    ATENCIÓN: Si habla español, tiene a bolanos disposición servicios gratuitos de asistencia lingüística. Llame al [phone].    We comply with applicable federal civil rights laws and [place] laws. We do not discriminate on the basis of race, color, national origin, age, disability, sex, sexual " orientation, or gender identity.            Thank you!     Thank you for choosing [facility]  for your care. Our goal is always to provide you with excellent care. Hearing back from our patients is one way we can continue to improve our services. Please take a few minutes to complete the written survey that you may receive in the mail after your visit with us. Thank you!             Your Updated Medication List - Protect others around you: Learn how to safely use, store and throw away your medicines at www.disposemymeds.org.          This list is accurate as of: 11/27/17  6:44 PM.  Always use your most recent med list.                   Brand Name Dispense Instructions for use Diagnosis    albuterol 108 (90 BASE) MCG/ACT Inhaler    PROAIR HFA/PROVENTIL HFA/VENTOLIN HFA    1 Inhaler    Inhale 2 puffs into the lungs every 4 hours as needed for shortness of breath / dyspnea for 30 days.    Acute asthmatic bronchitis       CLONIDINE HCL PO      Take 0.5 mg by mouth 2 times daily        FLUOXETINE HCL PO      Take 20 mg by mouth daily        REMERON PO      Take 15 mg by mouth At Bedtime        RISPERDAL PO      Take 7.5 mg by mouth At Bedtime        TRAZODONE HCL PO      Take 50 mg by mouth At Bedtime

## 2017-11-28 NOTE — PROGRESS NOTES
Subjective:    Patient is a 32 year old female presenting with rehab back hpi.   Judie Golden is a 32 year old female with a lumbar condition.  Condition occurred with:  Insidious onset.  Condition occurred: for unknown reasons.  This is a new and recurrent condition  September 2017 -insidious onset of bilateral low back pain about 2 months ago. Pain is mostly with walking, about 1 block is her limit. Sitting and lying on side is generally alright. Pain will go down bilateral legs. Had previous episodes of low back pain but did not have radicular symptomss.    Patient reports pain:  Lumbar spine left and lumbar spine right.  Radiates to:  Gluteals right, gluteals left, thigh right, thigh left, knee right, knee left, lower leg right and lower leg left.  Pain is described as aching and is intermittent Pain Scale: 8/10 at worst.  Associated symptoms:  Other, numbness, tingling and loss of motion/stiffness (bilateral leg swelling). Pain is the same all the time.  Symptoms are exacerbated by walking, standing and bending and relieved by rest (sitting).  Since onset symptoms are unchanged.  Special testing: none.  Previous treatment includes physical therapy (in the past, not for this round).  There was mild improvement following previous treatment.  General health as reported by patient is good.                                              Objective:    System         Lumbar/SI Evaluation  ROM:    AROM Lumbar:   Flexion:          80% to ankles  Ext:                    30% - ERP   Side Bend:        Left:  80%    Right:  90%  Rotation:           Left:     Right:   Side Glide:        Left:     Right:         Strength: hip extension: L 4+/5, R 4/5  Lumbar Myotomes:  normal            Lumbar DTR's:    L4 (Quad):  Left:  1   Right:  1      Lumbar Dermtomes:        L2 Left:  Normal-light touch     L2 Right:  Hypo-light touch  L3 Left:  Normal-light touch     L3 Right:  Normal-light touch  L4 Left:  Normal-light touch       L4  Right:  Hypo-light touch  L5 Left:  Normal-light touch     L5 Right:  Normal-light touch  S1 Left:  Normal-light touch     S1 Right:  Normal-light touch  Neural Tension/Mobility:      Left side:Slump  negative.     Right side:   Slump  negative.   Lumbar Palpation:    Tenderness present at Left:    Erector Spinae  Tenderness not present at Left:    Quadratus Lumborum  Tenderness present at Right: Erector Spinae  Tenderness not present at Right:  Quadratus Lumborum    Lumbar Provocation:  Lumbar provocation: (+) pain with spring testing L2-L3, L4-L5.                                                       [place] Lumbar Evaluation    Posture:  Sitting: fair  Standing: fair  Lordosis: Accentuated  Lateral Shift: no        Test Movements:  FIS: During: no effect  After: no effect  Mechanical Response: no effect  Repeat FIS: During: peripheralizing  After: peripheralizing and worse  Mechanical Response: DecrROM  EIS: During: increases  After: no worse    Repeat EIS: During: increases  After: centralizing  Mechanical Response: IncROM    EIL: During: increases  After: no worse    Repeat EIL: During: increases  After: no worse        Static Tests:          Lying Prone in Extension: pain almost abolished. ROSIBEL - slight incr ERP    Conclusion: derangement  Principle of Treatment:      Extension: RAFAELA and REIL progression                                           ROS    Assessment/Plan:      Patient is a 32 year old female with lumbar complaints.    Patient has the following significant findings with corresponding treatment plan.                Diagnosis 1:  Low Back pain with bilateral radiculopathy    Pain -  manual therapy, self management, education, directional preference exercise and home program  Decreased ROM/flexibility - manual therapy, therapeutic exercise and home program  Decreased strength - therapeutic exercise, therapeutic activities and home program  Decreased function - therapeutic activities and home  program  Impaired posture - neuro re-education, therapeutic activities and home program    Therapy Evaluation Codes:   1) History comprised of:   Personal factors that impact the plan of care:      None.    Comorbidity factors that impact the plan of care are:      None.     Medications impacting care: None.  2) Examination of Body Systems comprised of:   Body structures and functions that impact the plan of care:      Lumbar spine.   Activity limitations that impact the plan of care are:      Standing and Walking.  3) Clinical presentation characteristics are:   Stable/Uncomplicated.  4) Decision-Making    Low complexity using standardized patient assessment instrument and/or measureable assessment of functional outcome.  Cumulative Therapy Evaluation is: Low complexity.    Previous and current functional limitations:  (See Goal Flow Sheet for this information)    Short term and Long term goals: (See Goal Flow Sheet for this information)     Communication ability:  Patient appears to be able to clearly communicate and understand verbal and written communication and follow directions correctly.  Treatment Explanation - The following has been discussed with the patient:   RX ordered/plan of care  Anticipated outcomes  Possible risks and side effects  This patient would benefit from PT intervention to resume normal activities.   Rehab potential is good.    Frequency:  1 X week, once daily  Duration:  for 8 weeks  Discharge Plan:  Achieve all LTG.  Independent in home treatment program.  Reach maximal therapeutic benefit.    Please refer to the daily flowsheet for treatment today, total treatment time and time spent performing 1:1 timed codes.

## 2017-11-28 NOTE — PROGRESS NOTES
Subjective:    Patient is a 32 year old female presenting with rehab left ankle/foot hpi.                    and reported as 6/10.                General health as reported by patient is good.        Current medications:  Sleep medication and anti-depressants.      Primary job tasks include:  Prolonged sitting and lifting (pushing/pulling).              Oswestry Score: 22.22 %                 Objective:    System    Physical Exam    General     ROS    Assessment/Plan:

## 2018-03-07 PROBLEM — M54.41 ACUTE BILATERAL LOW BACK PAIN WITH RIGHT-SIDED SCIATICA: Status: RESOLVED | Noted: 2017-11-27 | Resolved: 2018-03-07

## 2018-03-07 NOTE — PROGRESS NOTES
Patient was only seen for the one visit of the Initial Evaluation on 11/27/17 and did not return for further follow up to complete their physical therapy plan of care, thus full DC status is unknown. Please refer to the Initial Evaluation note dated 11/27/17 for discharge information.   Their bout of care was limited to the one time visit on 11/27/17. Discharge patient from PT at this time.

## 2021-05-29 ENCOUNTER — RECORDS - HEALTHEAST (OUTPATIENT)
Dept: ADMINISTRATIVE | Facility: CLINIC | Age: 36
End: 2021-05-29

## 2021-06-16 PROBLEM — F51.04 CHRONIC INSOMNIA: Status: ACTIVE | Noted: 2017-08-08

## 2021-06-16 PROBLEM — F25.9 SCHIZOPHRENIA, SCHIZOAFFECTIVE, CHRONIC WITH ACUTE EXACERBATION (H): Status: ACTIVE | Noted: 2019-10-19

## 2021-06-16 PROBLEM — F31.9 BIPOLAR DISORDER WITH DEPRESSION (H): Status: ACTIVE | Noted: 2017-04-10

## 2021-06-16 PROBLEM — N60.82 CYST OF SKIN OF LEFT BREAST: Status: ACTIVE | Noted: 2017-01-21

## 2021-06-16 PROBLEM — R09.1 PLEURISY: Status: ACTIVE | Noted: 2019-03-19

## 2021-06-16 PROBLEM — F19.10 SUBSTANCE ABUSE (H): Status: ACTIVE | Noted: 2017-08-08

## 2021-06-16 PROBLEM — F41.8 DEPRESSION WITH ANXIETY: Status: ACTIVE | Noted: 2017-08-08

## 2021-06-16 PROBLEM — I45.6 WPW (WOLFF-PARKINSON-WHITE SYNDROME): Status: ACTIVE | Noted: 2017-09-08

## 2021-06-16 PROBLEM — F32.A DEPRESSION: Status: ACTIVE | Noted: 2019-10-19

## 2021-06-16 PROBLEM — F29 PSYCHOSIS, ATYPICAL (H): Status: ACTIVE | Noted: 2019-10-19
